# Patient Record
Sex: MALE | Race: WHITE
[De-identification: names, ages, dates, MRNs, and addresses within clinical notes are randomized per-mention and may not be internally consistent; named-entity substitution may affect disease eponyms.]

---

## 2018-05-01 ENCOUNTER — HOSPITAL ENCOUNTER (OUTPATIENT)
Dept: HOSPITAL 61 - ECHO | Age: 63
Discharge: HOME | End: 2018-05-01
Attending: INTERNAL MEDICINE
Payer: COMMERCIAL

## 2018-05-01 DIAGNOSIS — I42.8: Primary | ICD-10-CM

## 2018-05-01 PROCEDURE — 93306 TTE W/DOPPLER COMPLETE: CPT

## 2020-05-20 ENCOUNTER — HOSPITAL ENCOUNTER (INPATIENT)
Dept: HOSPITAL 61 - ER | Age: 65
LOS: 2 days | Discharge: HOME | DRG: 872 | End: 2020-05-22
Attending: INTERNAL MEDICINE | Admitting: INTERNAL MEDICINE
Payer: COMMERCIAL

## 2020-05-20 VITALS — SYSTOLIC BLOOD PRESSURE: 159 MMHG | DIASTOLIC BLOOD PRESSURE: 64 MMHG

## 2020-05-20 VITALS — SYSTOLIC BLOOD PRESSURE: 151 MMHG | DIASTOLIC BLOOD PRESSURE: 75 MMHG

## 2020-05-20 VITALS — WEIGHT: 165.79 LBS | HEIGHT: 70 IN | BODY MASS INDEX: 23.73 KG/M2

## 2020-05-20 DIAGNOSIS — I42.8: ICD-10-CM

## 2020-05-20 DIAGNOSIS — I25.119: ICD-10-CM

## 2020-05-20 DIAGNOSIS — F41.9: ICD-10-CM

## 2020-05-20 DIAGNOSIS — F32.9: ICD-10-CM

## 2020-05-20 DIAGNOSIS — I48.0: ICD-10-CM

## 2020-05-20 DIAGNOSIS — A41.9: Primary | ICD-10-CM

## 2020-05-20 DIAGNOSIS — B95.7: ICD-10-CM

## 2020-05-20 DIAGNOSIS — I11.0: ICD-10-CM

## 2020-05-20 DIAGNOSIS — Z83.3: ICD-10-CM

## 2020-05-20 DIAGNOSIS — I50.9: ICD-10-CM

## 2020-05-20 DIAGNOSIS — Z82.5: ICD-10-CM

## 2020-05-20 DIAGNOSIS — Z86.718: ICD-10-CM

## 2020-05-20 DIAGNOSIS — Z20.828: ICD-10-CM

## 2020-05-20 DIAGNOSIS — N39.0: ICD-10-CM

## 2020-05-20 DIAGNOSIS — Z82.49: ICD-10-CM

## 2020-05-20 DIAGNOSIS — E78.5: ICD-10-CM

## 2020-05-20 DIAGNOSIS — Z88.8: ICD-10-CM

## 2020-05-20 DIAGNOSIS — Z21: ICD-10-CM

## 2020-05-20 LAB
ALBUMIN SERPL-MCNC: 3.8 G/DL (ref 3.4–5)
ALBUMIN/GLOB SERPL: 1 {RATIO} (ref 1–1.7)
ALP SERPL-CCNC: 83 U/L (ref 46–116)
ALT SERPL-CCNC: 85 U/L (ref 16–63)
ANION GAP SERPL CALC-SCNC: 10 MMOL/L (ref 6–14)
APTT PPP: YELLOW S
AST SERPL-CCNC: 37 U/L (ref 15–37)
BACTERIA #/AREA URNS HPF: 0 /HPF
BASOPHILS # BLD AUTO: 0.1 X10^3/UL (ref 0–0.2)
BASOPHILS NFR BLD: 1 % (ref 0–3)
BILIRUB SERPL-MCNC: 0.7 MG/DL (ref 0.2–1)
BILIRUB UR QL STRIP: (no result)
BUN SERPL-MCNC: 19 MG/DL (ref 8–26)
BUN/CREAT SERPL: 19 (ref 6–20)
CALCIUM SERPL-MCNC: 8.9 MG/DL (ref 8.5–10.1)
CHLORIDE SERPL-SCNC: 103 MMOL/L (ref 98–107)
CO2 SERPL-SCNC: 25 MMOL/L (ref 21–32)
CREAT SERPL-MCNC: 1 MG/DL (ref 0.7–1.3)
EOSINOPHIL NFR BLD: 0.1 X10^3/UL (ref 0–0.7)
EOSINOPHIL NFR BLD: 1 % (ref 0–3)
ERYTHROCYTE [DISTWIDTH] IN BLOOD BY AUTOMATED COUNT: 13.8 % (ref 11.5–14.5)
FIBRINOGEN PPP-MCNC: (no result) MG/DL
GFR SERPLBLD BASED ON 1.73 SQ M-ARVRAT: 75.5 ML/MIN
GLOBULIN SER-MCNC: 3.8 G/DL (ref 2.2–3.8)
GLUCOSE SERPL-MCNC: 148 MG/DL (ref 70–99)
HCT VFR BLD CALC: 43.4 % (ref 39–53)
HGB BLD-MCNC: 15 G/DL (ref 13–17.5)
HYALINE CASTS #/AREA URNS LPF: (no result) /HPF
LYMPHOCYTES # BLD: 1.7 X10^3/UL (ref 1–4.8)
LYMPHOCYTES NFR BLD AUTO: 14 % (ref 24–48)
MCH RBC QN AUTO: 33 PG (ref 25–35)
MCHC RBC AUTO-ENTMCNC: 35 G/DL (ref 31–37)
MCV RBC AUTO: 95 FL (ref 79–100)
MONO #: 1.1 X10^3/UL (ref 0–1.1)
MONOCYTES NFR BLD: 9 % (ref 0–9)
NEUT #: 8.9 X10^3/UL (ref 1.8–7.7)
NEUTROPHILS NFR BLD AUTO: 75 % (ref 31–73)
NITRITE UR QL STRIP: NEGATIVE
PH UR STRIP: 5.5 [PH]
PLATELET # BLD AUTO: 204 X10^3/UL (ref 140–400)
POTASSIUM SERPL-SCNC: 4.1 MMOL/L (ref 3.5–5.1)
PROT SERPL-MCNC: 7.6 G/DL (ref 6.4–8.2)
PROT UR STRIP-MCNC: 30 MG/DL
RBC # BLD AUTO: 4.58 X10^6/UL (ref 4.3–5.7)
RBC #/AREA URNS HPF: >40 /HPF (ref 0–2)
SODIUM SERPL-SCNC: 138 MMOL/L (ref 136–145)
SQUAMOUS #/AREA URNS LPF: (no result) /LPF
UROBILINOGEN UR-MCNC: 1 MG/DL
WBC # BLD AUTO: 11.8 X10^3/UL (ref 4–11)
WBC #/AREA URNS HPF: >40 /HPF (ref 0–4)

## 2020-05-20 PROCEDURE — 81001 URINALYSIS AUTO W/SCOPE: CPT

## 2020-05-20 PROCEDURE — 93005 ELECTROCARDIOGRAM TRACING: CPT

## 2020-05-20 PROCEDURE — 96375 TX/PRO/DX INJ NEW DRUG ADDON: CPT

## 2020-05-20 PROCEDURE — 96361 HYDRATE IV INFUSION ADD-ON: CPT

## 2020-05-20 PROCEDURE — 83605 ASSAY OF LACTIC ACID: CPT

## 2020-05-20 PROCEDURE — G0378 HOSPITAL OBSERVATION PER HR: HCPCS

## 2020-05-20 PROCEDURE — 80053 COMPREHEN METABOLIC PANEL: CPT

## 2020-05-20 PROCEDURE — 85379 FIBRIN DEGRADATION QUANT: CPT

## 2020-05-20 PROCEDURE — 84443 ASSAY THYROID STIM HORMONE: CPT

## 2020-05-20 PROCEDURE — 96374 THER/PROPH/DIAG INJ IV PUSH: CPT

## 2020-05-20 PROCEDURE — G0379 DIRECT REFER HOSPITAL OBSERV: HCPCS

## 2020-05-20 PROCEDURE — 85025 COMPLETE CBC W/AUTO DIFF WBC: CPT

## 2020-05-20 PROCEDURE — 80061 LIPID PANEL: CPT

## 2020-05-20 PROCEDURE — 87086 URINE CULTURE/COLONY COUNT: CPT

## 2020-05-20 PROCEDURE — 93306 TTE W/DOPPLER COMPLETE: CPT

## 2020-05-20 PROCEDURE — 83880 ASSAY OF NATRIURETIC PEPTIDE: CPT

## 2020-05-20 PROCEDURE — 36415 COLL VENOUS BLD VENIPUNCTURE: CPT

## 2020-05-20 PROCEDURE — 71045 X-RAY EXAM CHEST 1 VIEW: CPT

## 2020-05-20 PROCEDURE — 84484 ASSAY OF TROPONIN QUANT: CPT

## 2020-05-20 PROCEDURE — 99285 EMERGENCY DEPT VISIT HI MDM: CPT

## 2020-05-20 RX ADMIN — MORPHINE SULFATE PRN MG: 4 INJECTION, SOLUTION INTRAMUSCULAR; INTRAVENOUS at 22:21

## 2020-05-20 RX ADMIN — Medication SCH ML: at 21:00

## 2020-05-20 RX ADMIN — ATORVASTATIN CALCIUM SCH MG: 40 TABLET, FILM COATED ORAL at 22:19

## 2020-05-20 RX ADMIN — OXYCODONE HYDROCHLORIDE AND ACETAMINOPHEN PRN TAB: 5; 325 TABLET ORAL at 19:51

## 2020-05-20 RX ADMIN — CEFTRIAXONE SCH GM: 1 INJECTION, POWDER, FOR SOLUTION INTRAMUSCULAR; INTRAVENOUS at 19:51

## 2020-05-20 NOTE — PHYS DOC
Adult General


Chief Complaint


Chief Complaint:  Chest pain





HPI


HPI





Patient is a 65  year old male with history of CAD presents with acute onset 

left sided chest pain radiating to shoulder and posterior neck.  Symptoms began 

2 hours prior to ED arrival and rated moderate to severe better significantly 

with nitroglycerin aspirin and fentanyl.  Chest pain similar to prior anginal 

episodes.  Associated symptoms include nausea shortness of breath.  No chills, 

sweats, fever, cough.  No leg pain, swelling.  History of DVT or PE.  Patient's 

cardiologist is Dr. Luke. []





Review of Systems


Review of Systems


Review of symptoms as per HPI.  All other review of symptoms are negative.]





All other systems were reviewed and found to be within normal limits, except as 

documented in this note.





Current Medications


Current Medications





Current Medications








 Medications


  (Trade)  Dose


 Ordered  Sig/Caitlin  Start Time


 Stop Time Status Last Admin


Dose Admin


 


 Morphine Sulfate


  (Morphine


 Sulfate)  4 mg  1X  ONCE  5/20/20 15:45


 5/20/20 16:01 DC 5/20/20 16:14


4 MG











Allergies


Allergies





Allergies








Coded Allergies Type Severity Reaction Last Updated Verified


 


  amoxicillin Allergy Intermediate Rash 5/20/20 Yes


 


  clavulanic acid Allergy Intermediate Rash 5/20/20 Yes


 


  codeine Allergy Intermediate Rash 5/20/20 Yes











Physical Exam


Physical Exam





Constitutional: Well developed, well nourished, no acute distress, non-toxic 

appearance. []


HENT: Normocephalic, atraumatic, bilateral external ears normal, oropharynx 

moist, no oral exudates, nose normal. []


Eyes: PERRLA, EOMI, conjunctiva normal, no discharge. [] 


Neck: Normal range of motion, no tenderness, supple, no stridor. [] 


Cardiovascular:Heart rate regular rhythm, no murmur []


Lungs & Thorax:  Bilateral breath sounds clear to auscultation []


Abdomen: Bowel sounds normal, soft, no tenderness, no masses, no pulsatile 

masses. [] 


Skin: Warm, dry, no erythema, no rash. [] 


Back: No tenderness, no CVA tenderness. [] 


Extremities: No tenderness, no cyanosis, no clubbing, ROM intact, no edema. [] 


Neurologic: Alert and oriented X 3, normal motor function, normal sensory 

function, no focal deficits noted. []


Psychologic: Affect normal, judgement normal, mood normal. []





Current Patient Data


Vital Signs





                                   Vital Signs








  Date Time  Temp Pulse Resp B/P (MAP) Pulse Ox O2 Delivery O2 Flow Rate FiO2


 


5/20/20 16:14   24  95 Room Air  


 


5/20/20 14:20 99.8 103  142/79 (100)    





 99.8       








Lab Values





                                Laboratory Tests








Test


 5/20/20


14:33


 


White Blood Count


 11.8 x10^3/uL


(4.0-11.0)  H


 


Red Blood Count


 4.58 x10^6/uL


(4.30-5.70)


 


Hemoglobin


 15.0 g/dL


(13.0-17.5)


 


Hematocrit


 43.4 %


(39.0-53.0)


 


Mean Corpuscular Volume


 95 fL ()





 


Mean Corpuscular Hemoglobin 33 pg (25-35)  


 


Mean Corpuscular Hemoglobin


Concent 35 g/dL


(31-37)


 


Red Cell Distribution Width


 13.8 %


(11.5-14.5)


 


Platelet Count


 204 x10^3/uL


(140-400)


 


Neutrophils (%) (Auto) 75 % (31-73)  H


 


Lymphocytes (%) (Auto) 14 % (24-48)  L


 


Monocytes (%) (Auto) 9 % (0-9)  


 


Eosinophils (%) (Auto) 1 % (0-3)  


 


Basophils (%) (Auto) 1 % (0-3)  


 


Neutrophils # (Auto)


 8.9 x10^3/uL


(1.8-7.7)  H


 


Lymphocytes # (Auto)


 1.7 x10^3/uL


(1.0-4.8)


 


Monocytes # (Auto)


 1.1 x10^3/uL


(0.0-1.1)


 


Eosinophils # (Auto)


 0.1 x10^3/uL


(0.0-0.7)


 


Basophils # (Auto)


 0.1 x10^3/uL


(0.0-0.2)


 


Sodium Level


 138 mmol/L


(136-145)


 


Potassium Level


 4.1 mmol/L


(3.5-5.1)


 


Chloride Level


 103 mmol/L


()


 


Carbon Dioxide Level


 25 mmol/L


(21-32)


 


Anion Gap 10 (6-14)  


 


Blood Urea Nitrogen


 19 mg/dL


(8-26)


 


Creatinine


 1.0 mg/dL


(0.7-1.3)


 


Estimated GFR


(Cockcroft-Gault) 75.5  





 


BUN/Creatinine Ratio 19 (6-20)  


 


Glucose Level


 148 mg/dL


(70-99)  H


 


Calcium Level


 8.9 mg/dL


(8.5-10.1)


 


Total Bilirubin


 0.7 mg/dL


(0.2-1.0)


 


Aspartate Amino Transferase


(AST) 37 U/L (15-37)





 


Alanine Aminotransferase (ALT)


 85 U/L (16-63)


H


 


Alkaline Phosphatase


 83 U/L


()


 


Troponin I Quantitative


 < 0.017 ng/mL


(0.000-0.055)


 


NT-Pro-B-Type Natriuretic


Peptide 70 pg/mL


(0-124)


 


Total Protein


 7.6 g/dL


(6.4-8.2)


 


Albumin


 3.8 g/dL


(3.4-5.0)


 


Albumin/Globulin Ratio 1.0 (1.0-1.7)  





                                Laboratory Tests


5/20/20 14:33








                                Laboratory Tests


5/20/20 14:33











EKG


EKG


[EKG: No acute]





Radiology/Procedures


Radiology/Procedures


[CXR: NAD]





Course & Med Decision Making


Course & Med Decision Making


Pertinent Labs and Imaging studies reviewed. (See chart for details)





[Atypical chest pain resolved with treatment in the emergency department.  

Borderline ST elevation inferior leads on EKG.  Troponin is negative.  Will 

admit to the hospital service for further evaluation and treatment.]





Dragon Disclaimer


Dragon Disclaimer


This electronic medical record was generated, in whole or in part, using a voice

recognition dictation system.





Departure


Departure


Impression:  


   Primary Impression:  


   Chest pain


Disposition:  09 ADMITTED AS INPATIENT


Condition:  STABLE


Referrals:  


SAVI GRIGSBY (PCP)











EMY IRVING DO                   May 20, 2020 14:33

## 2020-05-20 NOTE — EKG
[facility]

              [address] [place] [address]

Test Date:    2020               Test Time:    14:26:32

Pat Name:     NELLI CRUZ            Department:   

Patient ID:   [id number]           Room:          

Gender:       M                        Technician:   

:          [id number]               Requested By: EMY IRVING

Order Number: [id number]           Reading MD:   Natalio Bishop

                                 Measurements

Intervals                              Axis          

Rate:         99                       P:            12

TN:           166                      QRS:          66

QRSD:         80                       T:            55

QT:           344                                    

QTc:          447                                    

                           Interpretive Statements

SINUS RHYTHM



Electronically Signed On 2020 8:00:01 CDT by Natalio Bishop

## 2020-05-20 NOTE — NUR
Admit from ED to [facility] room 673 prior to shift change. A/O x 3. C/O chest pain rated 9/10 
described as pressure as well as cough. Reports going to [place] 3 times in the month of 
February. Orientated to room and call light. Reviewed POC to include Tele monitor, Lab draws 
such as troponin and COVID nasal swab results. Verbalized understanding. Resting in bed with 
call light at hand.

## 2020-05-20 NOTE — RAD
EXAM: CHEST 1 VIEW 

 

History: Chest pain 

 

COMPARISON: None available.

 

TECHNIQUE: Single portable radiograph of the chest

 

FINDINGS:  The cardiac silhouette is unremarkable. The lungs are clear 

bilaterally. The costophrenic sulci are clear and well demarcated.

 

IMPRESSION:  No radiographic evidence of an acute cardiopulmonary process.

 

 

Electronically signed by: Celestine Herron MD (5/20/2020 3:04 PM) [id number]

## 2020-05-20 NOTE — PDOC1
History and Physical


Date of Admission


Date of Admission


DATE: 5/20/20 


TIME: 20:33





History of Present Illness


History of Present Illness





Mr. Molina  is a 65  year old male with history of CAD presents with acute 

onset left sided chest pain radiating to shoulder and posterior neck.  Symptoms 

began 2 hours prior to ED arrival and rated moderate to severe better 

significantly with nitroglycerin aspirin and fentanyl.  Chest pain similar to 

prior anginal episodes.  Associated symptoms include nausea shortness of breath.

 No chills, sweats, fever, cough.  No leg pain, swelling.  History of DVT or PE.

 Patient's cardiologist is Dr. Luke. []





Past Medical History


Cardiovascular:  HTN, Other (had SVT,   s/p AMIO taken off years ago, )


Pulmonary:  No pertinent hx


Heme/Onc:  No pertinent hx


Hepatobiliary:  No pertinent hx


Infectious disease:  HIV


Renal/:  No pertinent hx





Past Surgical History


Past Surgical History:  No pertinent history





Family History


Family History:  No Significant





Social History


Smoke:  No


Drugs:  None





Current Problem List


Problem List


Problems


Medical Problems:


(1) Acute febrile illness


Status: Acute  





(2) Chest pain


Status: Acute  











Current Medications


Current Medications





Current Medications


Morphine Sulfate (Morphine Sulfate) 4 mg 1X  ONCE IV  Last administered on 

5/20/20at 16:14;  Start 5/20/20 at 15:45;  Stop 5/20/20 at 16:01;  Status DC


Sodium Chloride 1,000 ml @  1,000 mls/hr 1X  ONCE IV  Last administered on 

5/20/20at 17:14;  Start 5/20/20 at 17:00;  Stop 5/20/20 at 17:59;  Status DC


Ketorolac Tromethamine (Toradol 30mg Vial) 30 mg 1X  ONCE IVP  Last administered

 on 5/20/20at 17:12;  Start 5/20/20 at 17:00;  Stop 5/20/20 at 17:01;  Status DC


Oxycodone/ Acetaminophen (Percocet 5/325) 1 tab PRN Q4HRS  PRN PO PAIN Last 

administered on 5/20/20at 19:51;  Start 5/20/20 at 19:00


Ceftriaxone Sodium (Rocephin) 1 gm Q24H IVP  Last administered on 5/20/20at 

19:51;  Start 5/20/20 at 19:00


Docusate Sodium (Colace) 100 mg DAILY PO ;  Start 5/21/20 at 09:00


Morphine Sulfate (Morphine Sulfate) 4 mg PRN Q2HR  PRN IV PAIN;  Start 5/20/20 

at 20:30;  Status UNV


Nitroglycerin (Nitrostat) 0.4 mg PRN Q5MIN  PRN SL CHEST PAIN;  Start 5/20/20 at

 20:30;  Status UNV





Active Scripts


Active


Reported


Arimidex (Anastrozole) 1 Mg Tablet 1 Tab PO DAILY 30 Days


Durezol (Difluprednate) 5 Ml Drops 5 Ml OP BID


Triumeq Tablet (Abacavir/Dolutegravir/Lamivudi) 1 Each Tablet 1 Tab PO DAILY 30 

Days


Lipitor (Atorvastatin Calcium) 40 Mg Tablet 40 Mg PO HS


Coreg  ** (Carvedilol) 6.25 Mg Tablet 6.25 Mg PO BIDWMEALS





Allergies


Allergies:  


Coded Allergies:  


     amoxicillin (Verified  Allergy, Intermediate, Rash, 5/20/20)


     clavulanic acid (Verified  Allergy, Intermediate, Rash, 5/20/20)


     codeine (Verified  Allergy, Intermediate, Rash, 5/20/20)





ROS


General:  YES: Chills, Fatigue; 


   No: Night Sweats, Malaise, Appetite, Other


PSYCHOLOGICAL ROS:  YES: Sleep disturbances; 


   No: Anxiety, Behavioral Disorder, Concentration difficultie, Decreased 

libido, Depression, Disorientation, Hallucinations, Hostility, Irritablity, 

Memory difficulties, Mood Swings, Obsessive thoughts, Suicidal ideation, Other


Eyes:  No Blurry vision, No Decreased vision, No Double vision, No Dry eyes, No 

Excessive tearing, No Eye Pain, No Itchy Eyes, No Loss of vision, No 

Photophobia, No Scotomata, No Uses contacts, No Uses glasses, No Other


HEENT:  No: Heacaches, Visual Changes, Hearing change, Nasal congestion, Nasal 

discharge, Oral lesions, Sinus pain, Sore Throat, Epistaxis, Sneezing, Snoring, 

Tinnitus, Vertigo, Vocal changes, Other


Respiratory:  YES: Cough; 


   No: Hemoptysis, Orthopnea, Pleuritic Pain, Shortness of breath, SOB with 

excertion, Sputum Changes, Stridor, Tachypnea, Wheezing, Other


Cardiovascular:  yes Chest Pain


Gastrointestinal:  Yes Nausea; 


   No Vomiting, No Abdominal Pain, No Diarrhea, No Constipation, No Melena, No 

Hematochezia, No Other


Genitourinary:  No Dysuria, No Frequency, No Incontinence, No Hematuria, No Re

tention, No Discharge, No Urgency, No Pain, No Flank Pain, No Other, No , No , 

No , No , No , No , No 


Musculoskeletal:  No Gait Disturbance, No Joint Pain, No Joint Stiffness, No 

Joint Swelling, No Muscle Pain, No Muscular Weakness, No Pain In:, No Swelling 

In:, No Other


Neurological:  No Behavorial Changes, No Bowel/Bladder ControlChng, No 

Confusion, No Dizziness, No Gait Disturbance, No Headaches, No Impaired 

Coord/balance, No Memory Loss, No Numbness/Tingling, No Seizures, No Speech 

Problems, No Tremors, No Visual Changes, No Weakness, No Other


Skin:  Yes Dry Skin; 


   No Eczema, No Hair Changes, No Lumps, No Mole Changes, No Mottling, No Nail 

Changes, No Pruritus, No Rash, No Skin Lesion Changes, No Other, No Acne





Physical Exam


General:  Alert, Oriented X3, Cooperative, mild distress, moderate distress


HEENT:  Atraumatic, PERRLA, Mucous membr. moist/pink


Lungs:  Clear to auscultation


Heart:  S1S2, no gallops, no murmurs


Rectal Exam:  not examined


Extremities:  No clubbing, Normal pulses


Skin:  No rashes, No significant lesion


Neuro:  Normal speech, Normal tone, Sensation intact, Cranial nerves 3-12 NL


Psych/Mental Status:  Mood NL





Vitals


Vitals





Vital Signs








  Date Time  Temp Pulse Resp B/P (MAP) Pulse Ox O2 Delivery O2 Flow Rate FiO2


 


5/20/20 19:51   20  92 Room Air  


 


5/20/20 18:45 98.8 87  151/75 (100)    





 98.8       











Labs


Labs





Laboratory Tests








Test


 5/20/20


14:33 5/20/20


16:45 5/20/20


17:25


 


White Blood Count


 11.8 x10^3/uL


(4.0-11.0) 


 





 


Red Blood Count


 4.58 x10^6/uL


(4.30-5.70) 


 





 


Hemoglobin


 15.0 g/dL


(13.0-17.5) 


 





 


Hematocrit


 43.4 %


(39.0-53.0) 


 





 


Mean Corpuscular Volume 95 fL ()   


 


Mean Corpuscular Hemoglobin 33 pg (25-35)   


 


Mean Corpuscular Hemoglobin


Concent 35 g/dL


(31-37) 


 





 


Red Cell Distribution Width


 13.8 %


(11.5-14.5) 


 





 


Platelet Count


 204 x10^3/uL


(140-400) 


 





 


Neutrophils (%) (Auto) 75 % (31-73)   


 


Lymphocytes (%) (Auto) 14 % (24-48)   


 


Monocytes (%) (Auto) 9 % (0-9)   


 


Eosinophils (%) (Auto) 1 % (0-3)   


 


Basophils (%) (Auto) 1 % (0-3)   


 


Neutrophils # (Auto)


 8.9 x10^3/uL


(1.8-7.7) 


 





 


Lymphocytes # (Auto)


 1.7 x10^3/uL


(1.0-4.8) 


 





 


Monocytes # (Auto)


 1.1 x10^3/uL


(0.0-1.1) 


 





 


Eosinophils # (Auto)


 0.1 x10^3/uL


(0.0-0.7) 


 





 


Basophils # (Auto)


 0.1 x10^3/uL


(0.0-0.2) 


 





 


Sodium Level


 138 mmol/L


(136-145) 


 





 


Potassium Level


 4.1 mmol/L


(3.5-5.1) 


 





 


Chloride Level


 103 mmol/L


() 


 





 


Carbon Dioxide Level


 25 mmol/L


(21-32) 


 





 


Anion Gap 10 (6-14)   


 


Blood Urea Nitrogen


 19 mg/dL


(8-26) 


 





 


Creatinine


 1.0 mg/dL


(0.7-1.3) 


 





 


Estimated GFR


(Cockcroft-Gault) 75.5 


 


 





 


BUN/Creatinine Ratio 19 (6-20)   


 


Glucose Level


 148 mg/dL


(70-99) 


 





 


Calcium Level


 8.9 mg/dL


(8.5-10.1) 


 





 


Total Bilirubin


 0.7 mg/dL


(0.2-1.0) 


 





 


Aspartate Amino Transf


(AST/SGOT) 37 U/L (15-37) 


 


 





 


Alanine Aminotransferase


(ALT/SGPT) 85 U/L (16-63) 


 


 





 


Alkaline Phosphatase


 83 U/L


() 


 





 


Troponin I Quantitative


 < 0.017 ng/mL


(0.000-0.055) 


 





 


NT-Pro-B-Type Natriuretic


Peptide 70 pg/mL


(0-124) 


 





 


Total Protein


 7.6 g/dL


(6.4-8.2) 


 





 


Albumin


 3.8 g/dL


(3.4-5.0) 


 





 


Albumin/Globulin Ratio 1.0 (1.0-1.7)   


 


Urine Collection Type  Unknown  


 


Urine Color  Yellow  


 


Urine Clarity  Cloudy  


 


Urine pH  5.5 (<5.0-8.0)  


 


Urine Specific Gravity


 


 1.025


(1.000-1.030) 





 


Urine Protein


 


 30 mg/dL


(NEG-TRACE) 





 


Urine Glucose (UA)


 


 Negative mg/dL


(NEG) 





 


Urine Ketones (Stick)


 


 Negative mg/dL


(NEG) 





 


Urine Blood  Moderate (NEG)  


 


Urine Nitrite  Negative (NEG)  


 


Urine Bilirubin  Small (NEG)  


 


Urine Urobilinogen Dipstick


 


 1.0 mg/dL (0.2


mg/dL) 





 


Urine Leukocyte Esterase  Trace (NEG)  


 


Urine RBC  >40 /HPF (0-2)  


 


Urine WBC  >40 /HPF (0-4)  


 


Urine Squamous Epithelial


Cells 


 None /LPF 


 





 


Urine Bacteria  0 /HPF (0-FEW)  


 


Urine Hyaline Casts  Few /HPF  


 


Urine Mucus  Marked /LPF  


 


Lactic Acid Level


 


 


 2.2 mmol/L


(0.4-2.0)








Laboratory Tests








Test


 5/20/20


14:33 5/20/20


16:45 5/20/20


17:25


 


White Blood Count


 11.8 x10^3/uL


(4.0-11.0) 


 





 


Red Blood Count


 4.58 x10^6/uL


(4.30-5.70) 


 





 


Hemoglobin


 15.0 g/dL


(13.0-17.5) 


 





 


Hematocrit


 43.4 %


(39.0-53.0) 


 





 


Mean Corpuscular Volume 95 fL ()   


 


Mean Corpuscular Hemoglobin 33 pg (25-35)   


 


Mean Corpuscular Hemoglobin


Concent 35 g/dL


(31-37) 


 





 


Red Cell Distribution Width


 13.8 %


(11.5-14.5) 


 





 


Platelet Count


 204 x10^3/uL


(140-400) 


 





 


Neutrophils (%) (Auto) 75 % (31-73)   


 


Lymphocytes (%) (Auto) 14 % (24-48)   


 


Monocytes (%) (Auto) 9 % (0-9)   


 


Eosinophils (%) (Auto) 1 % (0-3)   


 


Basophils (%) (Auto) 1 % (0-3)   


 


Neutrophils # (Auto)


 8.9 x10^3/uL


(1.8-7.7) 


 





 


Lymphocytes # (Auto)


 1.7 x10^3/uL


(1.0-4.8) 


 





 


Monocytes # (Auto)


 1.1 x10^3/uL


(0.0-1.1) 


 





 


Eosinophils # (Auto)


 0.1 x10^3/uL


(0.0-0.7) 


 





 


Basophils # (Auto)


 0.1 x10^3/uL


(0.0-0.2) 


 





 


Sodium Level


 138 mmol/L


(136-145) 


 





 


Potassium Level


 4.1 mmol/L


(3.5-5.1) 


 





 


Chloride Level


 103 mmol/L


() 


 





 


Carbon Dioxide Level


 25 mmol/L


(21-32) 


 





 


Anion Gap 10 (6-14)   


 


Blood Urea Nitrogen


 19 mg/dL


(8-26) 


 





 


Creatinine


 1.0 mg/dL


(0.7-1.3) 


 





 


Estimated GFR


(Cockcroft-Gault) 75.5 


 


 





 


BUN/Creatinine Ratio 19 (6-20)   


 


Glucose Level


 148 mg/dL


(70-99) 


 





 


Calcium Level


 8.9 mg/dL


(8.5-10.1) 


 





 


Total Bilirubin


 0.7 mg/dL


(0.2-1.0) 


 





 


Aspartate Amino Transf


(AST/SGOT) 37 U/L (15-37) 


 


 





 


Alanine Aminotransferase


(ALT/SGPT) 85 U/L (16-63) 


 


 





 


Alkaline Phosphatase


 83 U/L


() 


 





 


Troponin I Quantitative


 < 0.017 ng/mL


(0.000-0.055) 


 





 


NT-Pro-B-Type Natriuretic


Peptide 70 pg/mL


(0-124) 


 





 


Total Protein


 7.6 g/dL


(6.4-8.2) 


 





 


Albumin


 3.8 g/dL


(3.4-5.0) 


 





 


Albumin/Globulin Ratio 1.0 (1.0-1.7)   


 


Urine Collection Type  Unknown  


 


Urine Color  Yellow  


 


Urine Clarity  Cloudy  


 


Urine pH  5.5 (<5.0-8.0)  


 


Urine Specific Gravity


 


 1.025


(1.000-1.030) 





 


Urine Protein


 


 30 mg/dL


(NEG-TRACE) 





 


Urine Glucose (UA)


 


 Negative mg/dL


(NEG) 





 


Urine Ketones (Stick)


 


 Negative mg/dL


(NEG) 





 


Urine Blood  Moderate (NEG)  


 


Urine Nitrite  Negative (NEG)  


 


Urine Bilirubin  Small (NEG)  


 


Urine Urobilinogen Dipstick


 


 1.0 mg/dL (0.2


mg/dL) 





 


Urine Leukocyte Esterase  Trace (NEG)  


 


Urine RBC  >40 /HPF (0-2)  


 


Urine WBC  >40 /HPF (0-4)  


 


Urine Squamous Epithelial


Cells 


 None /LPF 


 





 


Urine Bacteria  0 /HPF (0-FEW)  


 


Urine Hyaline Casts  Few /HPF  


 


Urine Mucus  Marked /LPF  


 


Lactic Acid Level


 


 


 2.2 mmol/L


(0.4-2.0)











VTE Prophylaxis Ordered


VTE Prophylaxis Devices:  No


VTE Pharmacological Prophylaxi:  Yes





Assessment/Plan


Assessment/Plan


chest pain, angina,  r/o ACS


cough,  r/o COVID with cehst pain and pressure, on PUI status in covid unit


HIV +, cont home meds


sepsis,  IV fluid given


UTI,  MINDY swartz IRA W MD                 May 20, 2020 20:38

## 2020-05-21 VITALS — SYSTOLIC BLOOD PRESSURE: 115 MMHG | DIASTOLIC BLOOD PRESSURE: 77 MMHG

## 2020-05-21 VITALS — SYSTOLIC BLOOD PRESSURE: 147 MMHG | DIASTOLIC BLOOD PRESSURE: 81 MMHG

## 2020-05-21 VITALS — SYSTOLIC BLOOD PRESSURE: 108 MMHG | DIASTOLIC BLOOD PRESSURE: 64 MMHG

## 2020-05-21 VITALS — DIASTOLIC BLOOD PRESSURE: 97 MMHG | SYSTOLIC BLOOD PRESSURE: 169 MMHG

## 2020-05-21 VITALS — SYSTOLIC BLOOD PRESSURE: 109 MMHG | DIASTOLIC BLOOD PRESSURE: 65 MMHG

## 2020-05-21 VITALS — DIASTOLIC BLOOD PRESSURE: 86 MMHG | SYSTOLIC BLOOD PRESSURE: 175 MMHG

## 2020-05-21 VITALS — SYSTOLIC BLOOD PRESSURE: 113 MMHG | DIASTOLIC BLOOD PRESSURE: 74 MMHG

## 2020-05-21 LAB
CHOLEST SERPL-MCNC: 123 MG/DL (ref 0–200)
CHOLEST/HDLC SERPL: 3.2 {RATIO}
HDLC SERPL-MCNC: 38 MG/DL (ref 40–60)
LDLC: 34 MG/DL (ref 0–100)
TRIGL SERPL-MCNC: 257 MG/DL (ref 0–150)
VLDLC: 51 MG/DL (ref 0–40)

## 2020-05-21 RX ADMIN — Medication SCH ML: at 09:00

## 2020-05-21 RX ADMIN — ANASTROZOLE SCH MG: 1 TABLET, COATED ORAL at 08:43

## 2020-05-21 RX ADMIN — DOCUSATE SODIUM SCH MG: 100 CAPSULE, LIQUID FILLED ORAL at 07:55

## 2020-05-21 RX ADMIN — APIXABAN SCH MG: 5 TABLET, FILM COATED ORAL at 21:22

## 2020-05-21 RX ADMIN — MORPHINE SULFATE PRN MG: 4 INJECTION, SOLUTION INTRAMUSCULAR; INTRAVENOUS at 15:45

## 2020-05-21 RX ADMIN — ATORVASTATIN CALCIUM SCH MG: 40 TABLET, FILM COATED ORAL at 21:20

## 2020-05-21 RX ADMIN — MORPHINE SULFATE PRN MG: 4 INJECTION, SOLUTION INTRAMUSCULAR; INTRAVENOUS at 10:09

## 2020-05-21 RX ADMIN — Medication SCH ML: at 21:00

## 2020-05-21 RX ADMIN — Medication SCH TAB: at 09:00

## 2020-05-21 RX ADMIN — OXYCODONE HYDROCHLORIDE AND ACETAMINOPHEN PRN TAB: 5; 325 TABLET ORAL at 21:22

## 2020-05-21 RX ADMIN — MORPHINE SULFATE PRN MG: 4 INJECTION, SOLUTION INTRAMUSCULAR; INTRAVENOUS at 17:51

## 2020-05-21 RX ADMIN — METOPROLOL TARTRATE SCH MG: 50 TABLET, FILM COATED ORAL at 21:21

## 2020-05-21 RX ADMIN — OXYCODONE HYDROCHLORIDE AND ACETAMINOPHEN PRN TAB: 5; 325 TABLET ORAL at 06:52

## 2020-05-21 RX ADMIN — MORPHINE SULFATE PRN MG: 4 INJECTION, SOLUTION INTRAMUSCULAR; INTRAVENOUS at 07:55

## 2020-05-21 RX ADMIN — CEFTRIAXONE SCH GM: 1 INJECTION, POWDER, FOR SOLUTION INTRAMUSCULAR; INTRAVENOUS at 17:51

## 2020-05-21 NOTE — NUR
Patient HR ranged from 130-174.  Patient was assessed and given his morning meds at 0800 
hours - Coreg PO and Morphine IVP. The oncall cardiologist was paged for further guidance.

## 2020-05-21 NOTE — NUR
Patient received IVP Metoprolol 5mg. Patient's HR range between 114 -136. ST rhythm 

-------------------------------------------------------------------------------

Addendum: 05/21/20 at 1227 by DARIA SANDOVAL RN RN

-------------------------------------------------------------------------------

Patient Received 1st Dose of Metoprolol at approximately 0915 hours. Patient's Rhythms : SR, 
ST, AFIB

## 2020-05-21 NOTE — EKG
[facility]

              [address] [place] [address]

Test Date:    2020               Test Time:    17:36:14

Pat Name:     NELLI CRUZ            Department:   

Patient ID:   [id number]           Room:         3 

Gender:       M                        Technician:   

:          [id number]               Requested By: ROSETTE GUILLEN

Order Number: [id number]           Reading MD:   Natalio Bishop

                                 Measurements

Intervals                              Axis          

Rate:         99                       P:            

ND:                                    QRS:          56

QRSD:         80                       T:            35

QT:           320                                    

QTc:          416                                    

                           Interpretive Statements

ATRIAL FIBRILLATION

LOW LIMB LEAD VOLTAGE

ST & T ABNORMALITY, CONSIDER HIGH LATERAL MYOCARDIAL DAMAGE

ABNORMAL ECG



Electronically Signed On 2020 8:27:38 CDT by Natalio Bishop

## 2020-05-21 NOTE — NUR
patient AFIB   -160. Orders received.  Patient received 500mcg IVP Digoxin at 1430 
hours. Patient's HR range after dosage 108 -120. At approximately 1505 hours, patient stood 
up to utilize the urinal - -180, /89 RR 24. After 15 minutes, patient's BP 
140/86 -120. At approximately 1600 hours, patient received Morphine and Zofran for 
pain and nausea/ dry heaving. Patient's /64 -130. Orders received for Diltiazem. 
Patient care transferred to JOSI Cardona CVC Nurse.

## 2020-05-21 NOTE — PDOC2
KALI MAHMOOD APRN 5/21/20 0951:


CARDIAC CONSULT


DATE OF CONSULT


Date of Consult


DATE: 5/21/20 


TIME: 09:35





REASON FOR CONSULT


Reason for Consult:


Chest pain





REFERRING PHYSICIAN


Referring Physician:


Dr. Vazquez





SOURCE


Source:  Chart review, Patient





HISTORY OF PRESENT ILLNESS


HISTORY OF PRESENT ILLNESS


This is a 64 yo male who presented secondary to chest pain. Patient reports 

acute onset of pain yesterday afternoon. Describes as pressure. Located in his 

central chest. Associated with shortness of breath. More painful to take a deep 

breath. No dizziness, diaphoresis, palpitations, or nausea/vomiting. This 

morning, went into AFIB with RVR. Rate currently 135 and patient denies any 

palpitations whatsoever. Pain was improved with morphine. Continues to having 

mild central chest pain.





PAST MEDICAL HISTORY


Cardiovascular:  AFIB (event monitor without AFIB, was taken off Amiodarone ), 

CHF (NICM), Hyperlipidemia, Other (SVT)


Psych:  Anxiety, Depression


Infectious disease:  HIV





PAST SURGICAL HISTORY


Past Surgical History:  Hernia Repair, Tonsillectomy





FAMILY HISTORY


Family History:  Asthma, Diabetes, Heart Disease, Hypertension





SOCIAL HISTORY


Smoke:  No (cigars )


ALCOHOL:  occassional


Drugs:  None


Lives:  with Family





CURRENT MEDICATIONS


CURRENT MEDICATIONS





Current Medications








 Medications


  (Trade)  Dose


 Ordered  Sig/Caitlin


 Route


 PRN Reason  Start Time


 Stop Time Status Last Admin


Dose Admin


 


 Morphine Sulfate


  (Morphine


 Sulfate)  4 mg  1X  ONCE


 IV


   5/20/20 15:45


 5/20/20 16:01 DC 5/20/20 16:14





 


 Sodium Chloride  1,000 ml @ 


 1,000 mls/hr  1X  ONCE


 IV


   5/20/20 17:00


 5/20/20 17:59 DC 5/20/20 17:14





 


 Ketorolac


 Tromethamine


  (Toradol 30mg


 Vial)  30 mg  1X  ONCE


 IVP


   5/20/20 17:00


 5/20/20 17:01 DC 5/20/20 17:12





 


 Oxycodone/


 Acetaminophen


  (Percocet 5/325)  1 tab  PRN Q4HRS  PRN


 PO


 PAIN  5/20/20 19:00


    5/21/20 06:52





 


 Ceftriaxone Sodium


  (Rocephin)  1 gm  Q24H


 IVP


   5/20/20 19:00


    5/20/20 19:51





 


 Docusate Sodium


  (Colace)  100 mg  DAILY


 PO


   5/21/20 09:00


    5/21/20 07:55





 


 Morphine Sulfate


  (Morphine


 Sulfate)  4 mg  PRN Q2HR  PRN


 IV


 PAIN  5/20/20 20:30


    5/21/20 07:55





 


 Atorvastatin


 Calcium


  (Lipitor)  40 mg  HS


 PO


   5/20/20 21:00


    5/20/20 22:19





 


 Carvedilol


  (Coreg)  6.25 mg  BIDWMEALS


 PO


   5/21/20 08:00


    5/21/20 07:55





 


 Enoxaparin Sodium


  (Lovenox 40mg


 Syringe)  40 mg  Q24H


 SQ


   5/20/20 21:00


    5/20/20 22:20





 


 Metoprolol


 Tartrate


  (Lopressor Vial)  5 mg  1X  ONCE


 IVP


   5/21/20 09:15


 5/21/20 09:16 DC 5/21/20 09:26














ALLERGIES


ALLERGIES:  


Coded Allergies:  


     amoxicillin (Verified  Allergy, Intermediate, Rash, 5/20/20)


     clavulanic acid (Verified  Allergy, Intermediate, Rash, 5/20/20)


     codeine (Verified  Allergy, Intermediate, Rash, 5/20/20)





ROS


Review of System


14 point ROS conducted with pertinent positives noted above in hPI





PHYSICAL EXAM


General:  Alert, Oriented X3, Cooperative, No acute distress


HEENT:  Atraumatic, Mucous membr. moist/pink


Lungs:  Clear to auscultation


Heart:  Other (IRRR; tele AFIB with RVR)


Abdomen:  Soft, No tenderness


Extremities:  No edema, Normal pulses


Skin:  No significant lesion


Neuro:  Normal speech, Strength at 5/5 X4 ext, Sensation intact


Psych/Mental Status:  Mental status NL, Mood NL


MUSCULOSKELETAL:  Osteoarthritic changes both hands





VITALS/I&O


VITALS/I&O:





                                   Vital Signs








  Date Time  Temp Pulse Resp B/P (MAP) Pulse Ox O2 Delivery O2 Flow Rate FiO2


 


5/21/20 09:26  150  128/71    


 


5/21/20 08:33      Room Air  


 


5/21/20 07:00 100.3  17  93  2.0 





 100.3       














                                    I & O   


 


 5/20/20 5/20/20 5/21/20





 15:00 23:00 07:00


 


Intake Total  1200 ml 150 ml


 


Balance  1200 ml 150 ml











LABS


Lab:





                                Laboratory Tests








Test


 5/20/20


14:33 5/20/20


16:45 5/20/20


17:25 5/20/20


20:25


 


White Blood Count


 11.8 x10^3/uL


(4.0-11.0)  H 


 


 





 


Red Blood Count


 4.58 x10^6/uL


(4.30-5.70) 


 


 





 


Hemoglobin


 15.0 g/dL


(13.0-17.5) 


 


 





 


Hematocrit


 43.4 %


(39.0-53.0) 


 


 





 


Mean Corpuscular Volume


 95 fL ()


 


 


 





 


Mean Corpuscular Hemoglobin 33 pg (25-35)     


 


Mean Corpuscular Hemoglobin


Concent 35 g/dL


(31-37) 


 


 





 


Red Cell Distribution Width


 13.8 %


(11.5-14.5) 


 


 





 


Platelet Count


 204 x10^3/uL


(140-400) 


 


 





 


Neutrophils (%) (Auto) 75 % (31-73)  H   


 


Lymphocytes (%) (Auto) 14 % (24-48)  L   


 


Monocytes (%) (Auto) 9 % (0-9)     


 


Eosinophils (%) (Auto) 1 % (0-3)     


 


Basophils (%) (Auto) 1 % (0-3)     


 


Neutrophils # (Auto)


 8.9 x10^3/uL


(1.8-7.7)  H 


 


 





 


Lymphocytes # (Auto)


 1.7 x10^3/uL


(1.0-4.8) 


 


 





 


Monocytes # (Auto)


 1.1 x10^3/uL


(0.0-1.1) 


 


 





 


Eosinophils # (Auto)


 0.1 x10^3/uL


(0.0-0.7) 


 


 





 


Basophils # (Auto)


 0.1 x10^3/uL


(0.0-0.2) 


 


 





 


Sodium Level


 138 mmol/L


(136-145) 


 


 





 


Potassium Level


 4.1 mmol/L


(3.5-5.1) 


 


 





 


Chloride Level


 103 mmol/L


() 


 


 





 


Carbon Dioxide Level


 25 mmol/L


(21-32) 


 


 





 


Anion Gap 10 (6-14)     


 


Blood Urea Nitrogen


 19 mg/dL


(8-26) 


 


 





 


Creatinine


 1.0 mg/dL


(0.7-1.3) 


 


 





 


Estimated GFR


(Cockcroft-Gault) 75.5  


 


 


 





 


BUN/Creatinine Ratio 19 (6-20)     


 


Glucose Level


 148 mg/dL


(70-99)  H 


 


 





 


Calcium Level


 8.9 mg/dL


(8.5-10.1) 


 


 





 


Total Bilirubin


 0.7 mg/dL


(0.2-1.0) 


 


 





 


Aspartate Amino Transferase


(AST) 37 U/L (15-37)


 


 


 





 


Alanine Aminotransferase (ALT)


 85 U/L (16-63)


H 


 


 





 


Alkaline Phosphatase


 83 U/L


() 


 


 





 


Troponin I Quantitative


 < 0.017 ng/mL


(0.000-0.055) 


 


 





 


NT-Pro-B-Type Natriuretic


Peptide 70 pg/mL


(0-124) 


 


 





 


Total Protein


 7.6 g/dL


(6.4-8.2) 


 


 





 


Albumin


 3.8 g/dL


(3.4-5.0) 


 


 





 


Albumin/Globulin Ratio 1.0 (1.0-1.7)     


 


Urine Collection Type  Unknown    


 


Urine Color  Yellow    


 


Urine Clarity  Cloudy    


 


Urine pH


 


 5.5 (<5.0-8.0)


 


 





 


Urine Specific Gravity


 


 1.025


(1.000-1.030) 


 





 


Urine Protein


 


 30 mg/dL


(NEG-TRACE) 


 





 


Urine Glucose (UA)


 


 Negative mg/dL


(NEG) 


 





 


Urine Ketones (Stick)


 


 Negative mg/dL


(NEG) 


 





 


Urine Blood


 


 Moderate (NEG)


 


 





 


Urine Nitrite


 


 Negative (NEG)


 


 





 


Urine Bilirubin  Small (NEG)    


 


Urine Urobilinogen Dipstick


 


 1.0 mg/dL (0.2


mg/dL) 


 





 


Urine Leukocyte Esterase  Trace (NEG)    


 


Urine RBC


 


 >40 /HPF (0-2)


 


 





 


Urine WBC


 


 >40 /HPF (0-4)


 


 





 


Urine Squamous Epithelial


Cells 


 None /LPF  


 


 





 


Urine Bacteria


 


 0 /HPF (0-FEW)


 


 





 


Urine Hyaline Casts  Few /HPF    


 


Urine Mucus  Marked /LPF    


 


Lactic Acid Level


 


 


 2.2 mmol/L


(0.4-2.0)  H 1.9 mmol/L


(0.4-2.0)


 


Test


 5/20/20


22:15 5/21/20


04:50 


 





 


Troponin I Quantitative


 < 0.017 ng/mL


(0.000-0.055) < 0.017 ng/mL


(0.000-0.055) 


 








                                Laboratory Tests


5/20/20 14:33








                                Laboratory Tests


5/20/20 14:33











ECHOCARDIOGRAM


ECHOCARDIOGRAM


<Conclusion>


The left ventricular systolic function is normal.


The Ejection Fraction is 55-60%.


There is normal LV segmental wall motion.


There is no evidence of significant pericardial effusion.





DATE:     05/01/18 0935





ASSESSMENT/PLAN


ASSESSMENT/PLAN


1.  Chest pain, mixed features; AMI ruled out


2.  AFIB with RVR; with h/o paroxysmal AFIB. ? contributing to above.  Initially

SR, but converted to AFIB with RVR this am. Event monitor in past without 

evidence of AFIB. Amiodarone was discontinued


3. H/o NICM; 2007. Cath at that time with normal coronaries. Most recent echo 

5/18 with LV recovery with EF 55-60% as noted above.


4.  Hyperlipidemia


5.  HIV


6.  low-grade fevers, poss UTI








Recommendations








IV Lopressor x1 now


Convert coreg to metoprolol for rate control


TSH


Lipids


Will start Eliquis for stroke prophylaxis given AFIB and HIV


Echo to assess LV systolic function if COVID ruled out


Plan for further ischemic workup, possibly as an outpatient.


Outpatient CV 


Supportive care





DANIELLE GODINEZ MD 5/21/20 1617:


CARDIAC CONSULT


ASSESSMENT/PLAN


ASSESSMENT/PLAN


Patient seen and evaluated


Agree with our nurse practitioners assessment and plan.


Chest pain.  Acute infarction ruled out.  Will check a d-dimer.  Echocardiogram 

once COVID ruled out.  Probable future outpatient stress testing.


Atrial fibrillation.  Rate control as above.  Will start on anticoagulation.  

Continue monitoring.


Hyperlipidemia.  Continue statins.  Check lab.


HIV.  Continue baseline medications.


History of a resolved nonischemic cardiomyopathy.  Echo as above.


Thank you for allowing us to participate in the care of your patient.











KALI MAHMOOD           May 21, 2020 09:51


DANIELLE GODINEZ MD            May 21, 2020 16:17

## 2020-05-21 NOTE — NUR
Patient received IVP Metoprolol 5mg at approximately 1155 hours, order received.  /71  
HR range 108 -130. Rhythm Afib.

## 2020-05-21 NOTE — PDOC
PROGRESS NOTES


Chief Complaint


Chief Complaint


Atypical chest pain rule out ACS


Cough rule out COVID infection


HIV positive


UTI?


Mildly elevated lactic acid without clinical significance





Plan


Awaiting for cardiology consult


Continue home


Symptomatic relief of symptoms


Reassurance provided


Further recommendations based on clinical course





History of Present Illness


History of Present Illness


No acute events reported overnight, case discussed with nursing staff patient in

no acute distress no complaints during my visit





Vitals


Vitals





Vital Signs








  Date Time  Temp Pulse Resp B/P (MAP) Pulse Ox O2 Delivery O2 Flow Rate FiO2


 


5/21/20 15:45   20   Nasal Cannula  


 


5/21/20 15:03  142  128/78    


 


5/21/20 15:00 97.9    95  2.0 





 97.9       











Physical Exam


General:  Alert, Oriented X3, Cooperative, mild distress, moderate distress


Heart:  Regular rate, Normal S1, Normal S2, No murmurs


Lungs:  Clear


Abdomen:  Normal bowel sounds, Soft, No tenderness


Extremities:  No clubbing, Normal pulses


Skin:  No rashes, No significant lesion





Labs


LABS





Laboratory Tests








Test


 5/20/20


16:45 5/20/20


17:25 5/20/20


20:25 5/20/20


22:15


 


Urine Collection Type Unknown    


 


Urine Color Yellow    


 


Urine Clarity Cloudy    


 


Urine pH 5.5 (<5.0-8.0)    


 


Urine Specific Gravity


 1.025


(1.000-1.030) 


 


 





 


Urine Protein


 30 mg/dL


(NEG-TRACE) 


 


 





 


Urine Glucose (UA)


 Negative mg/dL


(NEG) 


 


 





 


Urine Ketones (Stick)


 Negative mg/dL


(NEG) 


 


 





 


Urine Blood Moderate (NEG)    


 


Urine Nitrite Negative (NEG)    


 


Urine Bilirubin Small (NEG)    


 


Urine Urobilinogen Dipstick


 1.0 mg/dL (0.2


mg/dL) 


 


 





 


Urine Leukocyte Esterase Trace (NEG)    


 


Urine RBC >40 /HPF (0-2)    


 


Urine WBC >40 /HPF (0-4)    


 


Urine Squamous Epithelial


Cells None /LPF 


 


 


 





 


Urine Bacteria 0 /HPF (0-FEW)    


 


Urine Hyaline Casts Few /HPF    


 


Urine Mucus Marked /LPF    


 


Lactic Acid Level


 


 2.2 mmol/L


(0.4-2.0) 1.9 mmol/L


(0.4-2.0) 





 


Troponin I Quantitative


 


 


 


 < 0.017 ng/mL


(0.000-0.055)


 


Test


 5/21/20


04:50 


 


 





 


Troponin I Quantitative


 < 0.017 ng/mL


(0.000-0.055) 


 


 





 


Triglycerides Level


 257 mg/dL


(0-150) 


 


 





 


Cholesterol Level


 123 mg/dL


(0-200) 


 


 





 


LDL Cholesterol, Calculated


 34 mg/dL


(0-100) 


 


 





 


VLDL Cholesterol, Calculated


 51 mg/dL


(0-40) 


 


 





 


Non-HDL Cholesterol Calculated


 85 mg/dL


(0-129) 


 


 





 


HDL Cholesterol


 38 mg/dL


(40-60) 


 


 





 


Cholesterol/HDL Ratio 3.2    


 


Thyroid Stimulating Hormone


(TSH) 0.832 uIU/mL


(0.358-3.74) 


 


 














Assessment and Plan


Assessmemt and Plan


Problems


Medical Problems:


(1) Acute febrile illness


Status: Acute  





(2) Chest pain


Status: Acute  











Comment


Review of Relevant


I have reviewed the following items sheree (where applicable) has been applied.


Labs





Laboratory Tests








Test


 5/20/20


14:33 5/20/20


16:45 5/20/20


17:25 5/20/20


20:25


 


White Blood Count


 11.8 x10^3/uL


(4.0-11.0) 


 


 





 


Red Blood Count


 4.58 x10^6/uL


(4.30-5.70) 


 


 





 


Hemoglobin


 15.0 g/dL


(13.0-17.5) 


 


 





 


Hematocrit


 43.4 %


(39.0-53.0) 


 


 





 


Mean Corpuscular Volume 95 fL ()    


 


Mean Corpuscular Hemoglobin 33 pg (25-35)    


 


Mean Corpuscular Hemoglobin


Concent 35 g/dL


(31-37) 


 


 





 


Red Cell Distribution Width


 13.8 %


(11.5-14.5) 


 


 





 


Platelet Count


 204 x10^3/uL


(140-400) 


 


 





 


Neutrophils (%) (Auto) 75 % (31-73)    


 


Lymphocytes (%) (Auto) 14 % (24-48)    


 


Monocytes (%) (Auto) 9 % (0-9)    


 


Eosinophils (%) (Auto) 1 % (0-3)    


 


Basophils (%) (Auto) 1 % (0-3)    


 


Neutrophils # (Auto)


 8.9 x10^3/uL


(1.8-7.7) 


 


 





 


Lymphocytes # (Auto)


 1.7 x10^3/uL


(1.0-4.8) 


 


 





 


Monocytes # (Auto)


 1.1 x10^3/uL


(0.0-1.1) 


 


 





 


Eosinophils # (Auto)


 0.1 x10^3/uL


(0.0-0.7) 


 


 





 


Basophils # (Auto)


 0.1 x10^3/uL


(0.0-0.2) 


 


 





 


Sodium Level


 138 mmol/L


(136-145) 


 


 





 


Potassium Level


 4.1 mmol/L


(3.5-5.1) 


 


 





 


Chloride Level


 103 mmol/L


() 


 


 





 


Carbon Dioxide Level


 25 mmol/L


(21-32) 


 


 





 


Anion Gap 10 (6-14)    


 


Blood Urea Nitrogen


 19 mg/dL


(8-26) 


 


 





 


Creatinine


 1.0 mg/dL


(0.7-1.3) 


 


 





 


Estimated GFR


(Cockcroft-Gault) 75.5 


 


 


 





 


BUN/Creatinine Ratio 19 (6-20)    


 


Glucose Level


 148 mg/dL


(70-99) 


 


 





 


Calcium Level


 8.9 mg/dL


(8.5-10.1) 


 


 





 


Total Bilirubin


 0.7 mg/dL


(0.2-1.0) 


 


 





 


Aspartate Amino Transf


(AST/SGOT) 37 U/L (15-37) 


 


 


 





 


Alanine Aminotransferase


(ALT/SGPT) 85 U/L (16-63) 


 


 


 





 


Alkaline Phosphatase


 83 U/L


() 


 


 





 


Troponin I Quantitative


 < 0.017 ng/mL


(0.000-0.055) 


 


 





 


NT-Pro-B-Type Natriuretic


Peptide 70 pg/mL


(0-124) 


 


 





 


Total Protein


 7.6 g/dL


(6.4-8.2) 


 


 





 


Albumin


 3.8 g/dL


(3.4-5.0) 


 


 





 


Albumin/Globulin Ratio 1.0 (1.0-1.7)    


 


Urine Collection Type  Unknown   


 


Urine Color  Yellow   


 


Urine Clarity  Cloudy   


 


Urine pH  5.5 (<5.0-8.0)   


 


Urine Specific Gravity


 


 1.025


(1.000-1.030) 


 





 


Urine Protein


 


 30 mg/dL


(NEG-TRACE) 


 





 


Urine Glucose (UA)


 


 Negative mg/dL


(NEG) 


 





 


Urine Ketones (Stick)


 


 Negative mg/dL


(NEG) 


 





 


Urine Blood  Moderate (NEG)   


 


Urine Nitrite  Negative (NEG)   


 


Urine Bilirubin  Small (NEG)   


 


Urine Urobilinogen Dipstick


 


 1.0 mg/dL (0.2


mg/dL) 


 





 


Urine Leukocyte Esterase  Trace (NEG)   


 


Urine RBC  >40 /HPF (0-2)   


 


Urine WBC  >40 /HPF (0-4)   


 


Urine Squamous Epithelial


Cells 


 None /LPF 


 


 





 


Urine Bacteria  0 /HPF (0-FEW)   


 


Urine Hyaline Casts  Few /HPF   


 


Urine Mucus  Marked /LPF   


 


Lactic Acid Level


 


 


 2.2 mmol/L


(0.4-2.0) 1.9 mmol/L


(0.4-2.0)


 


Test


 5/20/20


22:15 5/21/20


04:50 


 





 


Troponin I Quantitative


 < 0.017 ng/mL


(0.000-0.055) < 0.017 ng/mL


(0.000-0.055) 


 





 


Triglycerides Level


 


 257 mg/dL


(0-150) 


 





 


Cholesterol Level


 


 123 mg/dL


(0-200) 


 





 


LDL Cholesterol, Calculated


 


 34 mg/dL


(0-100) 


 





 


VLDL Cholesterol, Calculated


 


 51 mg/dL


(0-40) 


 





 


Non-HDL Cholesterol Calculated


 


 85 mg/dL


(0-129) 


 





 


HDL Cholesterol


 


 38 mg/dL


(40-60) 


 





 


Cholesterol/HDL Ratio  3.2   


 


Thyroid Stimulating Hormone


(TSH) 


 0.832 uIU/mL


(0.358-3.74) 


 











Laboratory Tests








Test


 5/20/20


16:45 5/20/20


17:25 5/20/20


20:25 5/20/20


22:15


 


Urine Collection Type Unknown    


 


Urine Color Yellow    


 


Urine Clarity Cloudy    


 


Urine pH 5.5 (<5.0-8.0)    


 


Urine Specific Gravity


 1.025


(1.000-1.030) 


 


 





 


Urine Protein


 30 mg/dL


(NEG-TRACE) 


 


 





 


Urine Glucose (UA)


 Negative mg/dL


(NEG) 


 


 





 


Urine Ketones (Stick)


 Negative mg/dL


(NEG) 


 


 





 


Urine Blood Moderate (NEG)    


 


Urine Nitrite Negative (NEG)    


 


Urine Bilirubin Small (NEG)    


 


Urine Urobilinogen Dipstick


 1.0 mg/dL (0.2


mg/dL) 


 


 





 


Urine Leukocyte Esterase Trace (NEG)    


 


Urine RBC >40 /HPF (0-2)    


 


Urine WBC >40 /HPF (0-4)    


 


Urine Squamous Epithelial


Cells None /LPF 


 


 


 





 


Urine Bacteria 0 /HPF (0-FEW)    


 


Urine Hyaline Casts Few /HPF    


 


Urine Mucus Marked /LPF    


 


Lactic Acid Level


 


 2.2 mmol/L


(0.4-2.0) 1.9 mmol/L


(0.4-2.0) 





 


Troponin I Quantitative


 


 


 


 < 0.017 ng/mL


(0.000-0.055)


 


Test


 5/21/20


04:50 


 


 





 


Troponin I Quantitative


 < 0.017 ng/mL


(0.000-0.055) 


 


 





 


Triglycerides Level


 257 mg/dL


(0-150) 


 


 





 


Cholesterol Level


 123 mg/dL


(0-200) 


 


 





 


LDL Cholesterol, Calculated


 34 mg/dL


(0-100) 


 


 





 


VLDL Cholesterol, Calculated


 51 mg/dL


(0-40) 


 


 





 


Non-HDL Cholesterol Calculated


 85 mg/dL


(0-129) 


 


 





 


HDL Cholesterol


 38 mg/dL


(40-60) 


 


 





 


Cholesterol/HDL Ratio 3.2    


 


Thyroid Stimulating Hormone


(TSH) 0.832 uIU/mL


(0.358-3.74) 


 


 











Medications





Current Medications


Morphine Sulfate (Morphine Sulfate) 4 mg 1X  ONCE IV  Last administered on 

5/20/20at 16:14;  Start 5/20/20 at 15:45;  Stop 5/20/20 at 16:01;  Status DC


Sodium Chloride 1,000 ml @  1,000 mls/hr 1X  ONCE IV  Last administered on 

5/20/20at 17:14;  Start 5/20/20 at 17:00;  Stop 5/20/20 at 17:59;  Status DC


Ketorolac Tromethamine (Toradol 30mg Vial) 30 mg 1X  ONCE IVP  Last administered

on 5/20/20at 17:12;  Start 5/20/20 at 17:00;  Stop 5/20/20 at 17:01;  Status DC


Oxycodone/ Acetaminophen (Percocet 5/325) 1 tab PRN Q4HRS  PRN PO PAIN Last 

administered on 5/21/20at 06:52;  Start 5/20/20 at 19:00


Ceftriaxone Sodium (Rocephin) 1 gm Q24H IVP  Last administered on 5/20/20at 

19:51;  Start 5/20/20 at 19:00


Docusate Sodium (Colace) 100 mg DAILY PO  Last administered on 5/21/20at 07:55; 

Start 5/21/20 at 09:00


Morphine Sulfate (Morphine Sulfate) 4 mg PRN Q2HR  PRN IV PAIN Last administered

on 5/21/20at 15:45;  Start 5/20/20 at 20:30


Nitroglycerin (Nitrostat) 0.4 mg PRN Q5MIN  PRN SL CHEST PAIN;  Start 5/20/20 at

20:30


Benzonatate (Tessalon Perle) 100 mg PRN TID  PRN PO cough;  Start 5/20/20 at 

20:45


Guaifenesin/ Codeine Phosphate (Robitussin Ac) 5 ml PRN Q6HRS  PRN PO COUGH;  

Start 5/20/20 at 20:45;  Stop 5/20/20 at 20:50;  Status DC


Enoxaparin Sodium (Lovenox Per Pharmacy Prophylaxis Dosing) 1 each PRN DAILY  

PRN MC SEE COMMENTS;  Start 5/20/20 at 20:45


Anastrozole (Arimidex) 1 mg DAILY PO ;  Start 5/21/20 at 09:00


Atorvastatin Calcium (Lipitor) 40 mg HS PO  Last administered on 5/20/20at 22:19

;  Start 5/20/20 at 21:00


Carvedilol (Coreg) 6.25 mg BIDWMEALS PO  Last administered on 5/21/20at 07:55;  

Start 5/21/20 at 08:00


Non-Formulary Medication (Abacavir/ Dolutegravir/ Lamivudi (Triumeq Tablet)) 1 

tab DAILY PO ;  Start 5/21/20 at 09:00;  Status UNV


Non-Formulary Medication (Difluprednate (Durezol)) 5 ml BID OP ;  Start 5/20/20 

at 21:00;  Status UNV


Enoxaparin Sodium (Lovenox 40mg Syringe) 40 mg Q24H SQ  Last administered on 

5/20/20at 22:20;  Start 5/20/20 at 21:00


Guaifenesin (Robitussin Dm) 10 ml PRN Q6HRS  PRN PO COUGH;  Start 5/20/20 at 

21:00


Metoprolol Tartrate (Lopressor Vial) 5 mg 1X  ONCE IVP  Last administered on 

5/21/20at 09:26;  Start 5/21/20 at 09:15;  Stop 5/21/20 at 09:16;  Status DC


Metoprolol Tartrate (Lopressor Vial) 5 mg 1X  ONCE IVP ;  Start 5/21/20 at 

09:15;  Stop 5/21/20 at 09:06;  Status DC


Metoprolol Tartrate (Lopressor Vial) 5 mg 1X  ONCE IVP  Last administered on 

5/21/20at 11:50;  Start 5/21/20 at 12:00;  Stop 5/21/20 at 12:01;  Status DC


Digoxin (Lanoxin) 500 mcg 1X  ONCE IV  Last administered on 5/21/20at 15:03;  

Start 5/21/20 at 14:30;  Stop 5/21/20 at 14:31;  Status DC


Ondansetron HCl (Zofran) 4 mg PRN Q6HRS  PRN IVP NAUSEA/VOMITING Last 

administered on 5/21/20at 15:45;  Start 5/21/20 at 15:45





Active Scripts


Active


Reported


Arimidex (Anastrozole) 1 Mg Tablet 1 Tab PO DAILY 30 Days


Durezol (Difluprednate) 5 Ml Drops 5 Ml OP BID


Triumeq Tablet (Abacavir/Dolutegravir/Lamivudi) 1 Each Tablet 1 Tab PO DAILY 30 

Days


Lipitor (Atorvastatin Calcium) 40 Mg Tablet 40 Mg PO HS


Coreg  ** (Carvedilol) 6.25 Mg Tablet 6.25 Mg PO BIDWMEALS


Vitals/I & O





Vital Sign - Last 24 Hours








 5/20/20 5/20/20 5/20/20 5/20/20





 16:00 16:14 16:30 16:48


 


Pulse 97  96 


 


Resp 20 24 20 24


 


B/P (MAP) 157/82 (107)  162/82 (108) 


 


Pulse Ox 97 95 96 96


 


O2 Delivery  Room Air  Room Air





 5/20/20 5/20/20 5/20/20 5/20/20





 16:59 17:29 17:59 18:20


 


Temp 101.7   





 101.7   


 


Pulse 104 98 92 90


 


Resp 24 24 20 20


 


B/P (MAP) 161/97 (118) 160/72 (101) 139/67 (91) 140/72 (94)


 


Pulse Ox 97 97 96 97


 


O2 Delivery Room Air Room Air Room Air Room Air


 


    





    





 5/20/20 5/20/20 5/20/20 5/20/20





 18:45 19:51 20:00 20:51


 


Temp 98.8   





 98.8   


 


Pulse 87   


 


Resp 18 20 20


 


B/P (MAP) 151/75 (100)   


 


Pulse Ox 92 92  92


 


O2 Delivery Room Air Room Air Room Air Room Air


 


    





    





 5/20/20 5/20/20 5/20/20 5/21/20





 22:21 22:51 23:00 03:00


 


Temp   99.9 99.1





   99.9 99.1


 


Pulse   85 104


 


Resp 20 20 24


 


B/P (MAP)   159/64 (95) 169/97 (121)


 


Pulse Ox 92 92 97 92


 


O2 Delivery Room Air Room Air Room Air Nasal Cannula


 


O2 Flow Rate    2.0


 


    





    





 5/21/20 5/21/20 5/21/20 5/21/20





 06:52 07:00 07:55 08:00


 


Temp  100.3  





  100.3  


 


Pulse  145 104 


 


Resp 24 17  


 


B/P (MAP)  108/64 (79) 169/97 


 


Pulse Ox 92 93  


 


O2 Delivery Room Air Nasal Cannula  Room Air


 


O2 Flow Rate  2.0  


 


    





    





 5/21/20 5/21/20 5/21/20 5/21/20





 08:33 09:26 10:39 11:00


 


Temp    97.9





    97.9


 


Pulse  150  132


 


Resp    16


 


B/P (MAP)  128/71  109/65 (80)


 


Pulse Ox    97


 


O2 Delivery Room Air  Nasal Cannula Nasal Cannula


 


O2 Flow Rate   2.0 2.0


 


    





    





 5/21/20 5/21/20 5/21/20 5/21/20





 11:50 15:00 15:00 15:03


 


Temp   97.9 





   97.9 


 


Pulse 148 117 126 142


 


Resp   20 


 


B/P (MAP) 117/72 140/86 (104) 175/118 (137) 128/78


 


Pulse Ox   95 


 


O2 Delivery   Nasal Cannula 


 


O2 Flow Rate   2.0 


 


    





    





 5/21/20   





 15:45   


 


Resp 20   


 


O2 Delivery Nasal Cannula   














Intake and Output   


 


 5/20/20 5/20/20 5/21/20





 15:00 23:00 07:00


 


Intake Total  1200 ml 150 ml


 


Balance  1200 ml 150 ml

















KATEY HAMLIN MD             May 21, 2020 15:49

## 2020-05-21 NOTE — NUR
SS following for discharge planning. SS reviewed pt chart and discussed with pt RN. Pt is 
from home with spouse and is currently on PRN oxygen. Pt COVID19 test pending. SS will 
continue to follow for discharge planning.

## 2020-05-22 VITALS — SYSTOLIC BLOOD PRESSURE: 137 MMHG | DIASTOLIC BLOOD PRESSURE: 74 MMHG

## 2020-05-22 VITALS — SYSTOLIC BLOOD PRESSURE: 124 MMHG | DIASTOLIC BLOOD PRESSURE: 63 MMHG

## 2020-05-22 VITALS — SYSTOLIC BLOOD PRESSURE: 122 MMHG | DIASTOLIC BLOOD PRESSURE: 64 MMHG

## 2020-05-22 VITALS — SYSTOLIC BLOOD PRESSURE: 125 MMHG | DIASTOLIC BLOOD PRESSURE: 80 MMHG

## 2020-05-22 VITALS — DIASTOLIC BLOOD PRESSURE: 68 MMHG | SYSTOLIC BLOOD PRESSURE: 125 MMHG

## 2020-05-22 VITALS — DIASTOLIC BLOOD PRESSURE: 65 MMHG | SYSTOLIC BLOOD PRESSURE: 140 MMHG

## 2020-05-22 VITALS — SYSTOLIC BLOOD PRESSURE: 136 MMHG | DIASTOLIC BLOOD PRESSURE: 71 MMHG

## 2020-05-22 VITALS — SYSTOLIC BLOOD PRESSURE: 134 MMHG | DIASTOLIC BLOOD PRESSURE: 70 MMHG

## 2020-05-22 RX ADMIN — APIXABAN SCH MG: 5 TABLET, FILM COATED ORAL at 09:04

## 2020-05-22 RX ADMIN — METOPROLOL TARTRATE SCH MG: 50 TABLET, FILM COATED ORAL at 09:05

## 2020-05-22 RX ADMIN — ANASTROZOLE SCH MG: 1 TABLET, COATED ORAL at 09:05

## 2020-05-22 RX ADMIN — OXYCODONE HYDROCHLORIDE AND ACETAMINOPHEN PRN TAB: 5; 325 TABLET ORAL at 09:06

## 2020-05-22 RX ADMIN — Medication SCH ML: at 09:00

## 2020-05-22 RX ADMIN — DOCUSATE SODIUM SCH MG: 100 CAPSULE, LIQUID FILLED ORAL at 09:04

## 2020-05-22 RX ADMIN — Medication SCH TAB: at 09:00

## 2020-05-22 NOTE — NUR
Discharge Note:



NELLI CRUZ [address]



Discharge instructions and discharge home medications reviewed with Patient and a copy 
given. All questions have been answered and understanding verbalized. 



The following instructions and handouts were given: AFib, CP, Eliquis, metoprolol, cefdinir, 
monitoring BP and keeping record for f/u cards appointment



Discontinued lines and drains: Peripheral IV intact.



Patient discharged to Home or Self Care with Spouse via Wheelchair

## 2020-05-22 NOTE — CARD
MR#: [id number]

Account#: [id number]

Accession#: [id number]

Date of Study: 05/22/2020

Ordering Physician: KALI MAHMOOD, 

Referring Physician: KALI MAHMOOD, 

Tech: Ning Queen





--------------- APPROVED REPORT --------------





EXAM: Two-dimensional and M-mode echocardiogram with Doppler and color Doppler.



Other Information 

Quality : AverageHR: 65bpm



INDICATION

Chest Pain 



2D DIMENSIONS 

Left Atrium(2D)3.7 (1.6-4.0cm)IVSd1.2 (0.7-1.1cm)

Aortic Root(2D)3.0 (2.0-3.7cm)LVDd4.6 (3.9-5.9cm)

LVOT Diameter2.2 (1.8-2.4cm)PWd1.3 (0.7-1.1cm)

LVDs4.1 (2.5-4.0cm)FS (%) 12.4 %

SV26.5 mlLVEF(%)26.7 (>50%)



Aortic Valve

AoV Peak Luan.125.6cm/sAoV VTI24.9cm

AO Peak GR.6.3mmHgLVOT Peak Luan.98.3cm/s

LVOT  VTI 18.99cmAO Mean GR.3mmHg

DIMPLE (VMAX)2.85ll4WGZ   (VTI)2.96cm2



Mitral Valve

MV E Sdpmujgc39.4cm/sMV DECEL [id number]

MV A Gxvtbcpq75.5cm/sMV E Mean Gr.1mmHg

MV OHN79mcT/A  Ratio1.2

MVA (PHT)3.63cm2



TDI

E/Lateral E'8.7E/Medial E'11.8



Pulmonary Valve

PV Peak Xpqsqwig24.2cm/sPV Peak Grad.3mmHg



Tricuspid Valve

TR P. Zpkgehnb657ig/sRAP AOZSMXWZ7zqLp

TR Peak Gr.8ibEnZSHD31kqHm



 LEFT VENTRICLE 

The left ventricle is normal size. There is borderline to mild concentric left ventricular hypertroph
y. The left ventricular systolic function is normal and the ejection fraction is within normal range.
 The Ejection Fraction is 50-55%. There is normal LV segmental wall motion. Tissue Doppler imaging re
veals mild left ventricular diastolic dysfunction.



 RIGHT VENTRICLE 

The right ventricle is normal size. There is normal right ventricular wall thickness. The right ventr
icular systolic function is normal.



 ATRIA 

The left atrium size is normal. The right atrium size is normal. The interatrial septum is intact wit
h no evidence for an atrial septal defect or patent foramen ovale as noted on 2-D or Doppler imaging.




 AORTIC VALVE 

The aortic valve is calcified. Doppler and Color Flow revealed no significant aortic regurgitation. T
here is no significant aortic valvular stenosis.



 MITRAL VALVE 

The mitral valve is normal in structure and function. There is no evidence of mitral valve prolapse. 
There is no mitral valve stenosis. Doppler and Color-flow revealed trace mitral regurgitation.



 TRICUSPID VALVE 

The tricuspid valve is normal in structure and function. Doppler and Color Flow revealed no tricuspid
 valve regurgitation noted. There is no tricuspid valve stenosis.



 PULMONIC VALVE 

The pulmonic valve is not well visualized. Doppler and Color Flow revealed trace pulmonic valvular re
gurgitation. There is no pulmonic valvular stenosis.



 GREAT VESSELS 

The aortic root is normal in size. The IVC was not well visualized.



 PERICARDIAL EFFUSION 

There is no evidence of significant pericardial effusion.



Critical Notification

Critical Value: No



<Conclusion>

The left ventricular systolic function is normal and the ejection fraction is within normal range. Th
e Ejection Fraction is 50-55%.

There is normal LV segmental wall motion.



Signed by : Desmond Singletary, 

Electronically Approved : 05/22/2020 15:20:52

## 2020-05-22 NOTE — PDOC
EMIR PATEL APRN 5/22/20 1312:


CARDIO Progress Notes


Date and Time


Date of Service


5/22/20


Time of Evaluation


1240





Subjective


Subjective:  No Chest Pain, No shortness of breath, No Palpitations





Vitals


Vitals





Vital Signs








  Date Time  Temp Pulse Resp B/P (MAP) Pulse Ox O2 Delivery O2 Flow Rate FiO2


 


5/22/20 11:26 97.8 76 18 136/71 (92) 93 Room Air  





 97.8       


 


5/22/20 03:00       2.0 








Weight


Weight [ ]





Input and Output


Intake and Output











Intake and Output 


 


 5/22/20





 07:00


 


Intake Total 1074 ml


 


Output Total 800 ml


 


Balance 274 ml


 


 


 


Intake Oral 980 ml


 


IV Total 94 ml


 


Output Urine Total 800 ml


 


# Voids 3











Laboratory


Labs





Laboratory Tests








Test


 5/22/20


04:00


 


D-Dimer (Nadia)


 0.32 ug/mlFEU


(0.00-0.50)











Physical Exam


HEENT:  Neck Supple W Full Motion


Chest:  Symmetric


LUNGS:  Clear to Auscultation


Heart:  S1S2, RRR (SR), no gallops, no murmurs


Extremities:  No Calf Tenderness


Neurology:  alert, oriented, follow commands





Assessment


Assessment


1.  Chest pain, mixed features; AMI ruled out. likely from RVR. Negative for 

covid


2.  PAFIB with RVR: Converted to SR


3.  H/o NICM; 2007. Cath at that time with normal coronaries. Most recent echo 

5/18 with LV recovery with EF 55-60% as noted above


4.  Hyperlipidemia


5.  HIV


6.  low-grade fevers, poss UTI





Recommendations


1. Continue metoprolol. Will consider amiodarone as pt was on this before but 

discontinued due to low afib burden per [facility]


2. Eliquis for stroke prevention


3. TTE today if able


4. Plan for further ischemic workup, possibly as an outpatient


5. Follow up in office as scheduled





DANIELLE GODINEZ MD 5/22/20 1553:


CARDIO Progress Notes


Assessment


Assessment


I agree with our nurse practitioners assessment and plan as above.


Chest pain AMI ruled out. likely from RVR. Negative for covid.


PAFIB with RVR: Converted to SR. continuing beta-blockers and Eliquis.  Echo 

pending.


H/o NICM; 2007. Cath at that time with normal coronaries. Most recent echo 5/18 

with LV recovery with EF 55-60%.  Outpatient follow-up with probable ischemic 

work-up. 


Hyperlipidemia


HIV











EMIR PATEL          May 22, 2020 13:12


DANIELLE GODINEZ MD            May 22, 2020 15:53

## 2020-05-22 NOTE — NUR
SS following for discharge planning. SS reviewed pt chart and discussed with pt RN. Pt is 
from home with spouse and is currently on room air. Pt is COVID19 negative. SS will continue 
to follow for discharge planning.

## 2020-05-22 NOTE — PDOC3
Discharge Summary


Visit Information


Date of Admission:  May 20, 2020


Date of Discharge:  May 22, 2020


Admitting Diagnosis Comment:


chest pain, angina,  r/o ACS


cough,  r/o COVID with cehst pain and pressure, on PUI status in covid unit


HIV +, cont home meds


sepsis,  IV fluid given


UTI,  rocephin,


Final Diagnosis


Atypical chest pain ACS ruled out


Cough ruled out COVID infection


HIV positive


UTI secondary to staph epi


Mildly elevated lactic acid without clinical significance


New onset atrial fibrillation


newly started anticoagulation 











Brief Hospital Course


Allergies





                                    Allergies








Coded Allergies Type Severity Reaction Last Updated Verified


 


  amoxicillin Allergy Intermediate Rash 5/20/20 Yes


 


  clavulanic acid Allergy Intermediate Rash 5/20/20 Yes


 


  codeine Allergy Intermediate Rash 5/20/20 Yes








Vital Signs





Vital Signs








  Date Time  Temp Pulse Resp B/P (MAP) Pulse Ox O2 Delivery O2 Flow Rate FiO2


 


5/22/20 11:26 97.8 76 18 136/71 (92) 93 Room Air  





 97.8       


 


5/22/20 03:00       2.0 








Lab Results





Laboratory Tests








Test


 5/20/20


16:45 5/20/20


17:25 5/20/20


20:25 5/20/20


22:15


 


Urine Collection Type Unknown    


 


Urine Color Yellow    


 


Urine Clarity Cloudy    


 


Urine pH 5.5 (<5.0-8.0)    


 


Urine Specific Gravity


 1.025


(1.000-1.030) 


 


 





 


Urine Protein


 30 mg/dL


(NEG-TRACE) 


 


 





 


Urine Glucose (UA)


 Negative mg/dL


(NEG) 


 


 





 


Urine Ketones (Stick)


 Negative mg/dL


(NEG) 


 


 





 


Urine Blood Moderate (NEG)    


 


Urine Nitrite Negative (NEG)    


 


Urine Bilirubin Small (NEG)    


 


Urine Urobilinogen Dipstick


 1.0 mg/dL (0.2


mg/dL) 


 


 





 


Urine Leukocyte Esterase Trace (NEG)    


 


Urine RBC >40 /HPF (0-2)    


 


Urine WBC >40 /HPF (0-4)    


 


Urine Squamous Epithelial


Cells None /LPF 


 


 


 





 


Urine Bacteria 0 /HPF (0-FEW)    


 


Urine Hyaline Casts Few /HPF    


 


Urine Mucus Marked /LPF    


 


Lactic Acid Level


 


 2.2 mmol/L


(0.4-2.0) 1.9 mmol/L


(0.4-2.0) 





 


Coronavirus (COVID-19)(PCR)


 


 See separate


report 


 





 


Troponin I Quantitative


 


 


 


 < 0.017 ng/mL


(0.000-0.055)


 


Test


 5/21/20


04:50 5/22/20


04:00 


 





 


Troponin I Quantitative


 < 0.017 ng/mL


(0.000-0.055) 


 


 





 


Triglycerides Level


 257 mg/dL


(0-150) 


 


 





 


Cholesterol Level


 123 mg/dL


(0-200) 


 


 





 


LDL Cholesterol, Calculated


 34 mg/dL


(0-100) 


 


 





 


VLDL Cholesterol, Calculated


 51 mg/dL


(0-40) 


 


 





 


Non-HDL Cholesterol Calculated


 85 mg/dL


(0-129) 


 


 





 


HDL Cholesterol


 38 mg/dL


(40-60) 


 


 





 


Cholesterol/HDL Ratio 3.2    


 


Thyroid Stimulating Hormone


(TSH) 0.832 uIU/mL


(0.358-3.74) 


 


 





 


D-Dimer (Nadia)


 


 0.32 ug/mlFEU


(0.00-0.50) 


 











Laboratory Tests








Test


 5/22/20


04:00


 


D-Dimer (Nadia)


 0.32 ug/mlFEU


(0.00-0.50)








Brief Hospital Course


Patient admitted initially to be ruled out from COVID.  The patient 

unfortunately during his hospital stay presented atrial fibrillation with rapid 

ventricular response prompting a cardiological evaluation.  The patient has had 

history in the past of nonischemic cardiomyopathy diagnosed in mid 2006, his EF 

has ever since recovered from that initial insult.  The patient had a echocard

iogram approximately 2 years ago with a neck ejection fraction of 60%, the 

patient did not present signs and symptoms consistent with congestive heart 

failure or decompensation.  He is COVID-19 testing was negative he was 

transitioned from Cardizem drip to oral component of metoprolol and noted to 

have rate control and was deemed appropriate for dismissal from the 

cardiological standpoint of view


 


Consult recommendations are as follows





1.  Chest pain, mixed features; AMI ruled out. likely from RVR. Negative for 

covid


2.  PAFIB with RVR: Converted to SR


3.  H/o NICM; 2007. Cath at that time with normal coronaries. Most recent echo 

5/18 with LV recovery with EF 55-60% as noted above


4.  Hyperlipidemia


5.  HIV


6.  low-grade fevers, poss UTI





Recommendations


1. Continue metoprolol. Will consider amiodarone as pt was on this before but 

discontinued due to low afib burden per [facility]


2. Eliquis for stroke prevention


3. TTE today if able


4. Plan for further ischemic workup, possibly as an outpatient


5. Follow up in office as scheduled








Physical exam


General:  Alert, Oriented X3, Cooperative, mild distress, moderate distress


Heart:  Regular rate, Normal S1, Normal S2, No murmurs


Lungs:  Clear


Abdomen:  Normal bowel sounds, Soft, No tenderness


Extremities:  No clubbing, Normal pulses


Skin:  No rashes, No significant lesion





Discharge Information


Condition at Discharge:  Improved


Follow Up:  Weeks


Disposition/Orders:  D/C to Home


Scheduled


Abacavir/Dolutegravir/Lamivudi (Triumeq Tablet) 1 Each Tablet, 1 TAB PO DAILY 

for HIV for 30 Days, #30 Ref 0 (Reported)


   Entered as Reported by: MERRICK MILAN on 5/20/20 2018


   Last Action: Converted on 5/20/20 2033 by ROSETTE MARSH


Anastrozole (Arimidex) 1 Mg Tablet, 1 TAB PO DAILY for HIV/Autoimmune for 30 

Days, #30 Ref 0 (Reported)


   Entered as Reported by: MERRICK MILAN on 5/20/20 2025


   Last Action: Continued on 5/20/20 2033 by ROSETTE MARSH


Atorvastatin Calcium (Lipitor) 40 Mg Tablet, 40 MG PO HS for FOR CHOLESTEROL, #

30 Ref 0 (Reported)


   Entered as Reported by: MERRICK MILAN on 5/20/20 2016


   Last Action: Continued on 5/20/20 2033 by ROSETTE GUILLEN


Carvedilol (Coreg  **) 6.25 Mg Tablet, 6.25 MG PO BIDWMEALS for CARDIAC, (Repo

rted)


   Entered as Reported by: MERRICK MILAN on 5/20/20 2015


   Last Action: Continued on 5/20/20 2033 by ROSETTE GUILLEN


Difluprednate (Durezol) 5 Ml Drops, 5 ML OP BID for eye pain, (Reported)


   Entered as Reported by: MERRICK MILAN on 5/20/20 2024


   Last Action: Converted on 5/20/20 2033 by KATEY YUSUF MD             May 22, 2020 15:09